# Patient Record
(demographics unavailable — no encounter records)

---

## 2024-10-08 NOTE — HISTORY OF PRESENT ILLNESS
[Lower back] : lower back [Localized] : localized [Sharp] : sharp [Constant] : constant [de-identified] : DOI: 5/28/2002 Has worsening pain both hips. Had work related back injury, retired . Low back has been relatively stable, the pain is laterally in hips.  [] : no [FreeTextEntry1] : [place] [facility]

## 2024-10-08 NOTE — IMAGING
[Disc space narrowing] : Disc space narrowing [AP] : anteroposterior [Moderate arthritis (Tonnis Grade 2)] : Moderate arthritis (Tonnis Grade 2)

## 2024-10-08 NOTE — PROCEDURE
[Large Joint Injection] : Large joint injection [Bilateral] : bilaterally of the [Greater Trochanteric Bursa] : greater trochanteric bursa [Pain] : pain [Alcohol] : alcohol [Betadine] : betadine [Ethyl Chloride sprayed topically] : ethyl chloride sprayed topically [Sterile technique used] : sterile technique used [___ cc    3mg] :  Betamethasone (Celestone) ~Vcc of 3mg [___ cc    1%] : Lidocaine ~Vcc of 1%

## 2024-10-08 NOTE — WORK
[Total (100%)] : total (100%) [Does not reveal pre-existing condition(s) that may affect treatment/prognosis] : does not reveal pre-existing condition(s) that may affect treatment/prognosis [Cannot return to work because ________] : cannot return to work because [unfilled] [Walking] : walking [Standing] : standing [Unknown at this time] : : unknown at this time [Patient] : patient [No] : No [No Rx restrictions] : No Rx restrictions. [I provided the services listed above] :  I provided the services listed above. [FreeTextEntry1] : retired [Less than Sedentary Work:] :  Less than Sedentary Work: Unable to meet the requirement of Sedentary Work.

## 2024-11-19 NOTE — ASSESSMENT
[FreeTextEntry1] : I told him hip injections can be repeated every 3 months if needed, not to exceed 3 in a year

## 2024-11-19 NOTE — HISTORY OF PRESENT ILLNESS
[Lower back] : lower back [Localized] : localized [Sharp] : sharp [Constant] : constant [de-identified] : 11/19/2024 The hip injections did great, starting to wear off a bit DOI: 5/28/2002 Has worsening pain both hips. Had work related back injury, retired . Low back has been relatively stable, the pain is laterally in hips.  [] : no [FreeTextEntry1] : [place] [facility]

## 2025-01-07 NOTE — PROCEDURE
[Large Joint Injection] : Large joint injection [Bilateral] : bilaterally of the [Greater Trochanteric Bursa] : greater trochanteric bursa [Pain] : pain [Alcohol] : alcohol [Betadine] : betadine [Ethyl Chloride sprayed topically] : ethyl chloride sprayed topically [Sterile technique used] : sterile technique used [___ cc    3mg] :  Betamethasone (Celestone) ~Vcc of 3mg [___ cc    1%] : Lidocaine ~Vcc of 1%  [] : Patient tolerated procedure well [Call if redness, pain or fever occur] : call if redness, pain or fever occur [Apply ice for 15min out of every hour for the next 12-24 hours as tolerated] : apply ice for 15 minutes out of every hour for the next 12-24 hours as tolerated [Patient was advised to rest the joint(s) for ____ days] : patient was advised to rest the joint(s) for [unfilled] days [Previous OTC use and PT nontherapeutic] : patient has tried OTC's including aspirin, Ibuprofen, Aleve, etc or prescription NSAIDS, and/or exercises at home and/or physical therapy without satisfactory response [Patient had decreased mobility in the joint] : patient had decreased mobility in the joint [Risks, benefits, alternatives discussed / Verbal consent obtained] : the risks benefits, and alternatives have been discussed, and verbal consent was obtained

## 2025-01-07 NOTE — HISTORY OF PRESENT ILLNESS
[Lower back] : lower back [Localized] : localized [Sharp] : sharp [Constant] : constant [de-identified] : 01/07/2025 Has increasing pain back to hips again, requesting hip injections again 11/19/2024 The hip injections did great, starting to wear off a bit DOI: 5/28/2002 Has worsening pain both hips. Had work related back injury, retired . Low back has been relatively stable, the pain is laterally in hips.  [] : no [FreeTextEntry1] : [place] [facility]